# Patient Record
Sex: FEMALE | Race: BLACK OR AFRICAN AMERICAN
[De-identification: names, ages, dates, MRNs, and addresses within clinical notes are randomized per-mention and may not be internally consistent; named-entity substitution may affect disease eponyms.]

---

## 2022-02-09 ENCOUNTER — HOSPITAL ENCOUNTER (EMERGENCY)
Dept: HOSPITAL 83 - ED | Age: 37
Discharge: LEFT BEFORE DISCHARGE | End: 2022-02-09
Payer: COMMERCIAL

## 2022-02-09 DIAGNOSIS — Z53.21: Primary | ICD-10-CM

## 2025-01-14 ENCOUNTER — TELEPHONE (OUTPATIENT)
Age: 40
End: 2025-01-14

## 2025-01-14 DIAGNOSIS — Z3A.01 LESS THAN 8 WEEKS GESTATION OF PREGNANCY: Primary | ICD-10-CM

## 2025-01-14 NOTE — TELEPHONE ENCOUNTER
Received call from [facility] / [facility], pt had positive urine pregnancy test upon admission LMP first week of Nov (estimation), 7th pregnancy has had 2 prev c-sections 1 AB 1 miscarriage 2 vaginal deliveries. Requesting New OB appt please place MF orders.   Ph# [phone] for facility pt is currently in.

## 2025-01-15 NOTE — ADDENDUM NOTE
Addended by: RAMONA LLANOS on: 1/15/2025 08:12 AM     Modules accepted: Orders     Dressing: bandage

## 2025-01-16 ENCOUNTER — TELEPHONE (OUTPATIENT)
Dept: OBGYN CLINIC | Age: 40
End: 2025-01-16

## 2025-01-16 NOTE — TELEPHONE ENCOUNTER
Mayi from [facility] called and cancelled the dating ultrasound on 2/27/25 at the [facility] office.  Mayi said Radha left their facility AMA, and that Radha was from out of town. She does not have an OB provider yet. [facility] schedules the ultrasounds first to get dates and then sets the patient up for prenatal care with an OB/GYN.